# Patient Record
Sex: MALE | Employment: FULL TIME | URBAN - METROPOLITAN AREA
[De-identification: names, ages, dates, MRNs, and addresses within clinical notes are randomized per-mention and may not be internally consistent; named-entity substitution may affect disease eponyms.]

---

## 2020-03-12 ENCOUNTER — OFFICE VISIT (OUTPATIENT)
Dept: FAMILY MEDICINE | Facility: CLINIC | Age: 30
End: 2020-03-12
Payer: COMMERCIAL

## 2020-03-12 VITALS
HEIGHT: 68 IN | WEIGHT: 193 LBS | BODY MASS INDEX: 29.25 KG/M2 | HEART RATE: 89 BPM | OXYGEN SATURATION: 98 % | TEMPERATURE: 98.8 F | RESPIRATION RATE: 15 BRPM | SYSTOLIC BLOOD PRESSURE: 117 MMHG | DIASTOLIC BLOOD PRESSURE: 68 MMHG

## 2020-03-12 DIAGNOSIS — Z00.00 ROUTINE GENERAL MEDICAL EXAMINATION AT A HEALTH CARE FACILITY: Primary | ICD-10-CM

## 2020-03-12 DIAGNOSIS — Z13.220 LIPID SCREENING: ICD-10-CM

## 2020-03-12 DIAGNOSIS — E53.8 VITAMIN B12 DEFICIENCY (NON ANEMIC): ICD-10-CM

## 2020-03-12 LAB
BUN SERPL-MCNC: 12 MG/DL (ref 5–24)
CALCIUM SERPL-MCNC: 9.4 MG/DL (ref 8.5–10.4)
CHLORIDE SERPLBLD-SCNC: 105 MMOL/L (ref 94–109)
CHOLEST SERPL-MCNC: 195 MG/DL (ref 0–200)
CHOLEST/HDLC SERPL: 4.6 {RATIO} (ref 0–5)
CO2 SERPL-SCNC: 28 MMOL/L (ref 20–32)
CREAT SERPL-MCNC: 1.1 MG/DL (ref 0.8–1.5)
EGFR CALCULATED (BLACK REFERENCE): 101.8
EGFR CALCULATED (NON BLACK REFERENCE): 84.1
FASTING SPECIMEN: NO
GLUCOSE SERPL-MCNC: 93 MG/DL (ref 60–99)
HDLC SERPL-MCNC: 42 MG/DL
LDLC SERPL CALC-MCNC: 130 MG/DL (ref 0–129)
POTASSIUM SERPL-SCNC: 3.9 MMOL/L (ref 3.4–5.3)
SODIUM SERPL-SCNC: 144 MMOL/L (ref 137.3–146.3)
TRIGL SERPL-MCNC: 111 MG/DL (ref 0–150)
VIT B12 SERPL-MCNC: 404 PG/ML (ref 193–986)
VLDL-CHOLESTEROL: 22 (ref 7–32)

## 2020-03-12 ASSESSMENT — ANXIETY QUESTIONNAIRES
3. WORRYING TOO MUCH ABOUT DIFFERENT THINGS: MORE THAN HALF THE DAYS
7. FEELING AFRAID AS IF SOMETHING AWFUL MIGHT HAPPEN: MORE THAN HALF THE DAYS
5. BEING SO RESTLESS THAT IT IS HARD TO SIT STILL: NOT AT ALL
2. NOT BEING ABLE TO STOP OR CONTROL WORRYING: MORE THAN HALF THE DAYS
6. BECOMING EASILY ANNOYED OR IRRITABLE: NOT AT ALL
GAD7 TOTAL SCORE: 8
1. FEELING NERVOUS, ANXIOUS, OR ON EDGE: SEVERAL DAYS
IF YOU CHECKED OFF ANY PROBLEMS ON THIS QUESTIONNAIRE, HOW DIFFICULT HAVE THESE PROBLEMS MADE IT FOR YOU TO DO YOUR WORK, TAKE CARE OF THINGS AT HOME, OR GET ALONG WITH OTHER PEOPLE: SOMEWHAT DIFFICULT

## 2020-03-12 ASSESSMENT — PATIENT HEALTH QUESTIONNAIRE - PHQ9
SUM OF ALL RESPONSES TO PHQ QUESTIONS 1-9: 8
5. POOR APPETITE OR OVEREATING: SEVERAL DAYS

## 2020-03-12 ASSESSMENT — MIFFLIN-ST. JEOR: SCORE: 1812.32

## 2020-03-12 NOTE — LETTER
March 13, 2020      Tammy Ortiz  [address]  [facility] [address]    Mayelin Munoz,     Here are the results from your recent labs. Everything looks good. I see nothing in any of your results that is concerning. Feel free to contact the clinic or you can reach me directly through the Results Scorecard application if you have any questions or concerns.     Best Wishes,     Stew De La Fuente MD   5:09 PM, March 12, 2020         Resulted Orders   Lipid Panel ([facility])   Result Value Ref Range    FASTING SPECIMEN no     Cholesterol 195.0 0.0 - 200.0    HDL Cholesterol 42.0 >40.0    Triglycerides 111.0 0.0 - 150.0    Cholesterol/HDL Ratio 4.6 0.0 - 5.0    LDL Cholesterol Direct 130.0 (H) 0.0 - 129.0    VLDL-Cholesterol 22.0 7.0 - 32.0   Basic Metabolic Panel ([place])   Result Value Ref Range    Glucose 93.0 60.0 - 99.0 mg/dL    Urea Nitrogen 12.0 5.0 - 24.0 mg/dL    Calcium 9.4 8.5 - 10.4 mg/dL    Creatinine 1.1 0.8 - 1.5 mg/dL    eGFR Calculated (Non Black Reference) 84.1 >60.0    eGFR Calculated (Black Reference) 101.8 >60.0    Sodium 144.0 137.3 - 146.3 mmol/L    Potassium 3.9 3.4 - 5.3 mmol/L    Chloride 105.0 94.0 - 109.0 mmol/L    Carbon Dioxide 28.0 20.0 - 32.0 mmol/L   Vitamin B12   Result Value Ref Range    Vitamin B12 404 193 - 986 pg/mL

## 2020-03-12 NOTE — LETTER
[employer] Customer Service  [facility]  [address], [address]  [place] [address]  Fax: [phone]  Phone: [phone]      2020      Tammy Ortiz  [address] [place] [id number]        Dear Tammy,    Thank you for your interest in becoming a [employer] user!    Your access code is: [email or web address]  Expires: 2020  9:23 AM     Please access the [employer] website at [email or web address].  Below the ID and password fields, select the  Sign Up Now  as New User.  You will be prompted to enter the access code listed above as well as additional personal information.  Please follow the directions carefully when creating your username and password.    If you allow your access code to , or if you have any questions please call a [employer] Representative at [phone] during normal clinic hours.     Sincerely,      [employer] Customer Service  [facility]

## 2020-03-12 NOTE — NURSING NOTE
"29 year old  Chief Complaint   Patient presents with     [facility]     pt has a family Hx of high blood pressure, and chlesterol and wants to disccus this. He also reports sometimes he feels kind of dizzy and wants to do a kidney function test.        Blood pressure 117/68, pulse 89, temperature 98.8  F (37.1  C), temperature source Oral, resp. rate 15, height 1.723 m (5' 7.84\"), weight 87.5 kg (193 lb), SpO2 98 %. Body mass index is 29.49 kg/m .  There is no problem list on file for this patient.      Wt Readings from Last 2 Encounters:   03/12/20 87.5 kg (193 lb)     BP Readings from Last 3 Encounters:   03/12/20 117/68         Current Outpatient Medications   Medication     Multiple Vitamins-Minerals (MULTIVITAMIN ADULT PO)     vitamin B-12 (CYANOCOBALAMIN) 250 MCG tablet     No current facility-administered medications for this visit.        Social History     Tobacco Use     Smoking status: Never Smoker     Smokeless tobacco: Never Used   Substance Use Topics     Alcohol use: Not Currently     Drug use: Never       Health Maintenance Due   Topic Date Due     PREVENTIVE CARE VISIT  1990     DTAP/TDAP/TD IMMUNIZATION (1 - Tdap) 06/12/2001     HIV SCREENING  06/12/2005     INFLUENZA VACCINE (1) 09/01/2019     PHQ-2  01/01/2020       No results found for: PAP      March 12, 2020 8:44 AM  "

## 2020-03-12 NOTE — PROGRESS NOTES
"  3  SUBJECTIVE:   CC: Tammy Ortiz is an 29 year old male who presents for preventive health visit.     Healthy Habits:    Amount of exercise or daily activities, outside of work: 2-3 day(s) per week    Problems taking medications regularly not applicable    Medication side effects: No    Have you had an eye exam in the past two years? yes    Do you see a dentist twice per year? no    Do you have sleep apnea, excessive snoring or daytime drowsiness?no      PROBLEMS TO ADD ON...   Family history concerning for hypertension, high cholesterol, diabetes  - see family history  - his father recently went into a coma back in [place] which has scared patient    Anxiety and Depression  - he worries about his fiancee and his family back in [place] but says \"life must go on\"  - not interested in therapy today  - does worry about his health: he was dizzy the other day for a brief time, and noted he was sweaty the other day for a short time  - he just returned from [place] 3 days ago  - currently denies any symptoms as noted below in ROS    Today's PHQ-2 Score:   PHQ-2 ( 1999 [employer]) 3/12/2020   Q1: Little interest or pleasure in doing things 1   Q2: Feeling down, depressed or hopeless 2   PHQ-2 Score 3       PHQ 3/12/2020   PHQ-9 Total Score 8   Q9: Thoughts of better off dead/self-harm past 2 weeks Not at all     RENATO-7 SCORE 3/12/2020   Total Score 8       Abuse: Current or Past(Physical, Sexual or Emotional)- No  Do you feel safe in your environment? Yes        Social History     Tobacco Use     Smoking status: Never Smoker     Smokeless tobacco: Never Used   Substance Use Topics     Alcohol use: Not Currently     If you drink alcohol do you typically have >3 drinks per day or >7 drinks per week? No                      Last PSA: No results found for: PSA    Reviewed orders with patient. Reviewed health maintenance and updated orders accordingly - Yes    Reviewed and updated as needed this visit by clinical staff  Tobacco  " "Allergies  Meds  Med Hx  Surg Hx  Fam Hx  Soc Hx        Reviewed and updated as needed this visit by Provider  Allergies  Meds  Problems  Med Hx  Surg Hx  Fam Hx        History reviewed. No pertinent past medical history.   History reviewed. No pertinent surgical history.    ROS:  CONSTITUTIONAL: NEGATIVE for fever, chills, change in weight  INTEGUMENTARY/SKIN: NEGATIVE for worrisome rashes, moles or lesions  EYES: NEGATIVE for vision changes or irritation  ENT: NEGATIVE for ear, mouth and throat problems  RESP: NEGATIVE for significant cough or SOB  CV: NEGATIVE for chest pain, palpitations or peripheral edema  GI: NEGATIVE for nausea, abdominal pain, heartburn, or change in bowel habits   male: negative for dysuria, hematuria, decreased urinary stream, erectile dysfunction  MUSCULOSKELETAL: NEGATIVE for significant arthralgias or myalgia  NEURO: NEGATIVE for weakness, dizziness or paresthesias  PSYCHIATRIC: NEGATIVE for changes in mood or affect    OBJECTIVE:   /68   Pulse 89   Temp 98.8  F (37.1  C) (Oral)   Resp 15   Ht 1.723 m (5' 7.84\")   Wt 87.5 kg (193 lb)   SpO2 98%   BMI 29.49 kg/m    EXAM:  GENERAL: healthy, alert and no distress. Singular episode of sweating/heat as noted above  EYES: Eyes grossly normal to inspection, PERRL and conjunctivae and sclerae normal  HENT: ear canals and TM's normal, nose and mouth without ulcers or lesions  NECK: no adenopathy, no asymmetry, masses, or scars and thyroid normal to palpation  RESP: lungs clear to auscultation - no rales, rhonchi or wheezes  CV: regular rate and rhythm, normal S1 S2, no S3 or S4, no murmur, click or rub, no peripheral edema and peripheral pulses strong  ABDOMEN: soft, nontender, no hepatosplenomegaly, no masses and bowel sounds normal  MS: no gross musculoskeletal defects noted, no edema  SKIN: no suspicious lesions or rashes  NEURO: Normal strength and tone, mentation intact and speech normal. Singular episode of mild " "lightheadedness as noted above.  PSYCH: mentation appears normal, affect normal/bright    ASSESSMENT/PLAN:   Tammy was seen today for establish care.    Diagnoses and all orders for this visit:    Routine general medical examination at a health care facility  -     Basic Metabolic Panel ([place])    Vitamin B12 deficiency (non anemic)  -     Vitamin B12    Lipid screening  -     Lipid Panel ([facility])    Discussed life stressors given patient's separation from family and fiancee. He reports he is stable at this point. He does appear to be fairly nervous about some mild symptoms since returning for [place]. I advised him if any of his symptoms should return with any regularity I would encourage him to return to clinic at his earliest convenience.    Encouraged patient to return if concerns for mood worsen. He declines assistance with mood issues at this time.     COUNSELING:  Reviewed preventive health counseling, as reflected in patient instructions  Special attention given to:        Regular exercise       Healthy diet/nutrition       Vision screening    Estimated body mass index is 29.49 kg/m  as calculated from the following:    Height as of this encounter: 1.723 m (5' 7.84\").    Weight as of this encounter: 87.5 kg (193 lb).    Weight management plan: Discussed healthy diet and exercise guidelines     reports that he has never smoked. He has never used smokeless tobacco.      Counseling Resources:  ATP IV Guidelines  Pooled Cohorts Equation Calculator  FRAX Risk Assessment  ICSI Preventive Guidelines  Dietary Guidelines for Americans, 2010  USDA's MyPlate  ASA Prophylaxis  Lung CA Screening    Stew De La Fuente MD  [facility]  "

## 2020-03-13 ASSESSMENT — ANXIETY QUESTIONNAIRES: GAD7 TOTAL SCORE: 8

## 2020-05-26 NOTE — TELEPHONE ENCOUNTER
MEDICAL RECORDS REQUEST   [facility]  Urology Clinic  [address] [place] [address]  PHONE: [phone]  Fax: [phone]        FUTURE VISIT INFORMATION                                                   Tammy Ortiz, : 1990 scheduled for future visit at [facility]    APPOINTMENT INFORMATION:    Date: 20 1:20PM    Provider:  Howard Flores MD    Reason for Visit/Diagnosis: Testicular pain     REFERRAL INFORMATION:    Referring provider:  Self    Specialty: N/A    Referring providers clinic:  N/A    Clinic contact number:  N/A    RECORDS REQUESTED FOR VISIT                                                     NOTES  STATUS/DETAILS   OFFICE NOTE from referring provider  no   OFFICE NOTE from other specialist  no   DISCHARGE SUMMARY from hospital  no   DISCHARGE REPORT from the ER  no   OPERATIVE REPORT  no   MEDICATION LIST  no     PRE-VISIT CHECKLIST      Record collection complete Yes   Appointment appropriately scheduled           (right time/right provider) Yes   MyChart activation Yes   Questionnaire complete If no, please explain: In process      Completed by: Patsy Madison

## 2020-05-27 ENCOUNTER — PRE VISIT (OUTPATIENT)
Dept: UROLOGY | Facility: CLINIC | Age: 30
End: 2020-05-27

## 2020-05-27 NOTE — TELEPHONE ENCOUNTER
Reason for Visit: Consult    Diagnosis: Testicular Pain    Orders/Procedures/Records: in system    Contact Patient: n/a    Rooming Requirements: Slava Rick LPN  05/27/20  11:37 AM

## 2020-05-28 ENCOUNTER — PRE VISIT (OUTPATIENT)
Dept: UROLOGY | Facility: CLINIC | Age: 30
End: 2020-05-28

## 2020-05-28 ENCOUNTER — VIRTUAL VISIT (OUTPATIENT)
Dept: UROLOGY | Facility: CLINIC | Age: 30
End: 2020-05-28
Payer: COMMERCIAL

## 2020-05-28 DIAGNOSIS — N45.1 EPIDIDYMITIS: Primary | ICD-10-CM

## 2020-05-28 RX ORDER — SULFAMETHOXAZOLE/TRIMETHOPRIM 800-160 MG
1 TABLET ORAL 2 TIMES DAILY
Qty: 24 TABLET | Refills: 0 | Status: SHIPPED | OUTPATIENT
Start: 2020-05-28

## 2020-05-28 NOTE — NURSING NOTE
Chief Complaint   Patient presents with     Consult     testicular pain     Doximity     There were no vitals taken for this visit. There is no height or weight on file to calculate BMI.    There is no problem list on file for this patient.      No Known Allergies    Current Outpatient Medications   Medication Sig Dispense Refill     Multiple Vitamins-Minerals (MULTIVITAMIN ADULT PO) Take by mouth daily         Social History     Tobacco Use     Smoking status: Never Smoker     Smokeless tobacco: Never Used   Substance Use Topics     Alcohol use: Not Currently     Drug use: Never       Delilah Kendrick CMA  5/28/2020  12:42 PM

## 2020-05-28 NOTE — LETTER
"5/28/2020       RE: Tammy Ortiz  [address] [facility] [address]     Dear Colleague,    Thank you for referring your patient, Tammy Ortiz, to the [facility] AND [facility] at [facility]. Please see a copy of my visit note below.    Tammy Ortiz is a 29 year old male who is being evaluated via a billable video visit.      The patient has been notified of following:     \"This video visit will be conducted via a call between you and your physician/provider. We have found that certain health care needs can be provided without the need for an in-person physical exam.  This service lets us provide the care you need with a video conversation.  If a prescription is necessary we can send it directly to your pharmacy.  If lab work is needed we can place an order for that and you can then stop by our lab to have the test done at a later time.    Video visits are billed at different rates depending on your insurance coverage.  Please reach out to your insurance provider with any questions.    If during the course of the call the physician/provider feels a video visit is not appropriate, you will not be charged for this service.\"    Patient has given verbal consent for Video visit? Yes    How would you like to obtain your AVS? Mail a copy    Patient would like the video invitation sent by: Text to cell phone: [phone]    Will anyone else be joining your video visit? No        Video-Visit Details    Type of service:  Video Visit    Video Start Time: 1:31 PM  Video End Time: 1:49 PM    Originating Location (pt. Location): Home    Distant Location (provider location):  [facility] AND [facility] PROST[facility] UROLOGIC [facility]CERS     Platform used for Video Visit: Doximity     I am seeing Tammy Ortiz for evaluation of testis pain.    HPI:  Tammy Ortiz is a 29 year old male with history of left testis pain.    He has pain when " standing/ kneeling.  Feels more pain in the hot weather.    Pain symtpoms:  Onset of the pain: started 5 months ago.  US was done in [place], was diagnosed with a  Bacteria infection, and symptoms cleared.  Symptoms recurred 2 months later.  Location/Radiation of the pain: left testis.  He sees some prominent blood vessels in the left scrotum skin itself, superficially.  Duration: pain starts spontaneously, just with certain movements, lasts 10 secs.  Quality of the pain: feels dull, stretching.    Also grade 1 varicocele was seen on the scrotal ultrasound.  SA was done and was normal.    Severity of the pain: 3-4/10 in severity.  Timing of the pain: kneeling makes it worse.  Exacerbating/relieving factors:  Better in the morning.  Lying down generally feels better.      REVIEW OF SYSTEMS:  General: negative  Skin: negative  Eyes: negative  Ears/Nose/Throat: negative  Respiratory: negative  Cardiovascular: negative  Gastrointestinal: negative  Genitourinary: see HPI  Musculoskeletal: negative  Neurologic: negative  Psychiatric: negative  Hematologic/Lymphatic/Immunologic: negative  Endocrine: negative    PAST MEDICAL HX:  No chronic medical problems     PAST SURG HX:  No history of surgery.      FAMILY HX:  Family History   Problem Relation Age of Onset     Hypertension Mother      Hyperlipidemia Mother      Hypertension Father      Diabetes Type 2  Father        SOCIAL HX:  Social History     Tobacco Use     Smoking status: Never Smoker     Smokeless tobacco: Never Used   Substance Use Topics     Alcohol use: Not Currently     Drug use: Never       MEDICATIONS:  Current Outpatient Medications   Medication Sig     Multiple Vitamins-Minerals (MULTIVITAMIN ADULT PO) Take by mouth daily     No current facility-administered medications for this visit.        ALLERGIES:  Patient has no known allergies.      GENERAL PHYSICAL EXAM:   Exam  General- Alert, oriented, nad.  Pleasant and conversant.  Eyes- anicteric,  EOMI.  Resps- normal, non-labored.  No cough  Abdomen-  nondistended.   exam- deferred.   Neurological - no tremors  Skin - no discoloration/ lesions noted  Psychiatric - no anxiety, alert & oriented.       The rest of a comprehensive physical examination is deferred due to PHE (public health emergency) video visit restrictions.      Imaging/labs:  Lab Results   Component Value Date    CR 1.1 03/12/2020       ASSESSMENT:     Left  testis pain.  Suspect epididymitis or sequelae of this.    Left varicocele, grade 1.  Discussed that he's probably had this since puberty.  Observation is the most reasonable at this time, especially given a normal semen analysis.  Discussed that micro repair or embolization are options if the pain is very bothersome enough to warrant a procedure.    PLAN:    Balaji Fitch recommended.    Discussed risks, benefits, and alternatives of varicocele repair, including various methods.  I counseled him extensively on the nature of varicoceles, and their possible effects on pain, fertility, and testosterone production.  I described surgery and embolization approaches, and the detailed risks of surgical repair.  These include damage to artery (ischemia), damage to lymphatics ( hydrocele), as well as risk of recurrence (~1%). Discussed that about 2/3rds of men see improved semen parameters after varicocele repair and that improvement takes at least 3 months to see.  Discussed that varicocele pain typically improves with repair, but in rare cases the testis can become sensitive after a surgical repair.     Return to clinic PRN.    Howard Flores MD     Urological Surgeon

## 2020-05-28 NOTE — PROGRESS NOTES
"Tammy Ortiz is a 29 year old male who is being evaluated via a billable video visit.      The patient has been notified of following:     \"This video visit will be conducted via a call between you and your physician/provider. We have found that certain health care needs can be provided without the need for an in-person physical exam.  This service lets us provide the care you need with a video conversation.  If a prescription is necessary we can send it directly to your pharmacy.  If lab work is needed we can place an order for that and you can then stop by our lab to have the test done at a later time.    Video visits are billed at different rates depending on your insurance coverage.  Please reach out to your insurance provider with any questions.    If during the course of the call the physician/provider feels a video visit is not appropriate, you will not be charged for this service.\"    Patient has given verbal consent for Video visit? Yes    How would you like to obtain your AVS? Mail a copy    Patient would like the video invitation sent by: Text to cell phone: [phone]    Will anyone else be joining your video visit? No        Video-Visit Details    Type of service:  Video Visit    Video Start Time: 1:31 PM  Video End Time: 1:49 PM    Originating Location (pt. Location): Home    Distant Location (provider location):  [facility] AND [facility] FOR PROSTATE AND UROLOGIC CANCERS     Platform used for Video Visit: FunCaptcha     I am seeing Tammy Ortiz for evaluation of testis pain.    HPI:  Tammy Ortiz is a 29 year old male with history of left testis pain.    He has pain when standing/ kneeling.  Feels more pain in the hot weather.    Pain symtpoms:  Onset of the pain: started 5 months ago.  US was done in [place], was diagnosed with a  Bacteria infection, and symptoms cleared.  Symptoms recurred 2 months later.  Location/Radiation of the pain: left testis.  He sees some prominent blood " vessels in the left scrotum skin itself, superficially.  Duration: pain starts spontaneously, just with certain movements, lasts 10 secs.  Quality of the pain: feels dull, stretching.    Also grade 1 varicocele was seen on the scrotal ultrasound.  SA was done and was normal.    Severity of the pain: 3-4/10 in severity.  Timing of the pain: kneeling makes it worse.  Exacerbating/relieving factors:  Better in the morning.  Lying down generally feels better.      REVIEW OF SYSTEMS:  General: negative  Skin: negative  Eyes: negative  Ears/Nose/Throat: negative  Respiratory: negative  Cardiovascular: negative  Gastrointestinal: negative  Genitourinary: see HPI  Musculoskeletal: negative  Neurologic: negative  Psychiatric: negative  Hematologic/Lymphatic/Immunologic: negative  Endocrine: negative    PAST MEDICAL HX:  No chronic medical problems     PAST SURG HX:  No history of surgery.      FAMILY HX:  Family History   Problem Relation Age of Onset     Hypertension Mother      Hyperlipidemia Mother      Hypertension Father      Diabetes Type 2  Father        SOCIAL HX:  Social History     Tobacco Use     Smoking status: Never Smoker     Smokeless tobacco: Never Used   Substance Use Topics     Alcohol use: Not Currently     Drug use: Never       MEDICATIONS:  Current Outpatient Medications   Medication Sig     Multiple Vitamins-Minerals (MULTIVITAMIN ADULT PO) Take by mouth daily     No current facility-administered medications for this visit.        ALLERGIES:  Patient has no known allergies.      GENERAL PHYSICAL EXAM:   Exam  General- Alert, oriented, nad.  Pleasant and conversant.  Eyes- anicteric, EOMI.  Resps- normal, non-labored.  No cough  Abdomen-  nondistended.   exam- deferred.   Neurological - no tremors  Skin - no discoloration/ lesions noted  Psychiatric - no anxiety, alert & oriented.       The rest of a comprehensive physical examination is deferred due to PHE (public health emergency) video visit  restrictions.      Imaging/labs:  Lab Results   Component Value Date    CR 1.1 03/12/2020       ASSESSMENT:     Left  testis pain.  Suspect epididymitis or sequelae of this.    Left varicocele, grade 1.  Discussed that he's probably had this since puberty.  Observation is the most reasonable at this time, especially given a normal semen analysis.  Discussed that micro repair or embolization are options if the pain is very bothersome enough to warrant a procedure.    PLAN:    Balaji Fitch recommended.    Discussed risks, benefits, and alternatives of varicocele repair, including various methods.  I counseled him extensively on the nature of varicoceles, and their possible effects on pain, fertility, and testosterone production.  I described surgery and embolization approaches, and the detailed risks of surgical repair.  These include damage to artery (ischemia), damage to lymphatics ( hydrocele), as well as risk of recurrence (~1%). Discussed that about 2/3rds of men see improved semen parameters after varicocele repair and that improvement takes at least 3 months to see.  Discussed that varicocele pain typically improves with repair, but in rare cases the testis can become sensitive after a surgical repair.     Return to clinic PRN.    Howard Flores MD     Urological Surgeon

## 2020-12-01 ENCOUNTER — OFFICE VISIT (OUTPATIENT)
Dept: FAMILY MEDICINE | Facility: CLINIC | Age: 30
End: 2020-12-01
Payer: COMMERCIAL

## 2020-12-01 VITALS
SYSTOLIC BLOOD PRESSURE: 137 MMHG | HEART RATE: 72 BPM | OXYGEN SATURATION: 98 % | BODY MASS INDEX: 27.62 KG/M2 | TEMPERATURE: 97 F | WEIGHT: 186.5 LBS | HEIGHT: 69 IN | DIASTOLIC BLOOD PRESSURE: 81 MMHG | RESPIRATION RATE: 15 BRPM

## 2020-12-01 DIAGNOSIS — M54.50 BILATERAL LOW BACK PAIN WITHOUT SCIATICA, UNSPECIFIED CHRONICITY: Primary | ICD-10-CM

## 2020-12-01 ASSESSMENT — MIFFLIN-ST. JEOR: SCORE: 1796.34

## 2020-12-01 NOTE — NURSING NOTE
"30 year old  Chief Complaint   Patient presents with     Back Pain     intermitttent low back pain x 6 months        Blood pressure 137/81, pulse 72, temperature 97  F (36.1  C), temperature source Skin, resp. rate 15, height 1.753 m (5' 9\"), weight 84.6 kg (186 lb 8 oz), SpO2 98 %. Body mass index is 27.54 kg/m .  There is no problem list on file for this patient.      Wt Readings from Last 2 Encounters:   12/01/20 84.6 kg (186 lb 8 oz)   03/12/20 87.5 kg (193 lb)     BP Readings from Last 3 Encounters:   12/01/20 137/81   03/12/20 117/68         Current Outpatient Medications   Medication     Multiple Vitamins-Minerals (MULTIVITAMIN ADULT PO)     sulfamethoxazole-trimethoprim (BACTRIM DS) 800-160 MG tablet     No current facility-administered medications for this visit.        Social History     Tobacco Use     Smoking status: Never Smoker     Smokeless tobacco: Never Used   Substance Use Topics     Alcohol use: Not Currently     Drug use: Never       Health Maintenance Due   Topic Date Due     HIV SCREENING  06/12/2005     HEPATITIS C SCREENING  06/12/2008     DTAP/TDAP/TD IMMUNIZATION (1 - Tdap) 06/12/2015     INFLUENZA VACCINE (1) 09/01/2020       No results found for: PAP      December 1, 2020 9:52 AM    "

## 2020-12-01 NOTE — PROGRESS NOTES
SUBJECTIVE:   Tammy Ortiz is a 30 year old male who presents to clinic today to discuss the following problem(s).    Low back pain for the past 6 months.   - per patient, was sleeping on a bad mattress and had a bad chair he was sitting on for long hours and eventually started to feel low back pain  - the pain is not constant, but rather can flare up episodically  - actually, right now he is not having any pain at all  - denies any LE radiculopathy, no LE paresthesia or weakness  - no bowel or bladder incontinence  - thinks he has previously used OTC topical analgesics but has not tried these recently  - has never taken oral analgesics for the pain  - no previous history of PT    SPICE: Patient is from [place]. His family remains in [place]. Since my last visit with patient his father passed away back home.     ROS: 10 point ROS neg other than the symptoms noted above in the HPI.    Today's PHQ-2:  PHQ-2 ( 1999 [employer]) 12/1/2020 3/12/2020   Q1: Little interest or pleasure in doing things 0 1   Q2: Feeling down, depressed or hopeless 0 2   PHQ-2 Score 0 3       Past Medical History:   Diagnosis Date     Bilateral low back pain without sciatica      History reviewed. No pertinent surgical history.  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. Lives with a roommate in an apartment. Engaged to a woman, she is still in [place]. Hopefully she can come here in 6 months.       Current Outpatient Medications   Medication     Multiple Vitamins-Minerals (MULTIVITAMIN ADULT PO)     sulfamethoxazole-trimethoprim (BACTRIM DS)  "800-160 MG tablet     No current facility-administered medications for this visit.      I have reviewed the patient's past medical, surgical, family, and social history.     OBJECTIVE:   /81 (BP Location: Right arm, Patient Position: Sitting, Cuff Size: Adult Large)   Pulse 72   Temp 97  F (36.1  C) (Skin)   Resp 15   Ht 1.753 m (5' 9\")   Wt 84.6 kg (186 lb 8 oz)   SpO2 98%   BMI 27.54 kg/m      Constitutional: well-appearing, appears stated age  Eyes: conjunctivae without erythema, sclera anicteric.   Cardiac: regular rate and rhythm, normal S1/S2, no murmur/rubs/gallops  Respiratory: lungs clear to auscultation bilaterally, normal work of breathing, no wheezes/crackles  MSK: Low back  - no visible malformation or skin changes  - no tenderness to palpation at para-spinous muscles  - able to bend over and touch his toes, straight legged.   - negative FADER/FABIR  - negative straight leg raise  Skin: no rashes, lesions, or wounds  Psych: affect is full and appropriate, speech is fluent and non-pressured    ASSESSMENT AND PLAN:     Tammy was seen today for back pain.    Diagnoses and all orders for this visit:    Bilateral low back pain without sciatica, unspecified chronicity  -     [facility]; Future    Suspect chronic low back muscle strain. Referred as noted above. Encouraged the use of topical analgesics as needed. Advised follow up if symptoms fail to improve or worsen with current treatment regimen      Stew De La Fuente MD  [facility]  12/01/2020, 9:54 AM    "

## 2021-01-04 ENCOUNTER — HEALTH MAINTENANCE LETTER (OUTPATIENT)
Age: 31
End: 2021-01-04

## 2021-04-25 ENCOUNTER — HEALTH MAINTENANCE LETTER (OUTPATIENT)
Age: 31
End: 2021-04-25

## 2021-10-10 ENCOUNTER — HEALTH MAINTENANCE LETTER (OUTPATIENT)
Age: 31
End: 2021-10-10

## 2022-05-22 ENCOUNTER — HEALTH MAINTENANCE LETTER (OUTPATIENT)
Age: 32
End: 2022-05-22

## 2022-09-24 ENCOUNTER — HEALTH MAINTENANCE LETTER (OUTPATIENT)
Age: 32
End: 2022-09-24

## 2023-06-04 ENCOUNTER — HEALTH MAINTENANCE LETTER (OUTPATIENT)
Age: 33
End: 2023-06-04